# Patient Record
(demographics unavailable — no encounter records)

---

## 2024-11-18 NOTE — REASON FOR VISIT
[Behavioral Health Urgent Care Assessment] : a behavioral health urgent care assessment [School] : school [Patient] : patient [Self] : alone [Family Member] : with family member [TextBox_17] : for presenting adjustment and anxiety symptoms.
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
## 2024-11-18 NOTE — DISCUSSION/SUMMARY
[Low acute suicide risk] : Low acute suicide risk [No] : No [Not clinically indicated] : Safety Plan completed/updated (for individuals at risk): Not clinically indicated [FreeTextEntry1] : Risk factors: male gender, depressive symptoms, anxiety symptoms, h/o ADHD, psychosocial stressors, not in treatment, family history of substance abuse.   Protective factors: domiciled with supportive family, engaged in school, no prior SA or SIB, not current si/i/p, no h/o violence, no current hi/i/p, help-seeking, motivated for outpatient treatment, future oriented with short and long term goals, barriers to suicide, no access to guns, not acutely manic or psychotic, no substance abuse, no trauma hx, no family history of suicide.

## 2024-11-18 NOTE — RISK ASSESSMENT
[Clinical Interview] : Clinical Interview [Collateral Sources] : Collateral Sources [ADHD] : ADHD [Impulsivity] : impulsivity [Severe anxiety, agitation or panic] : severe anxiety, agitation or panic [Non-compliant or not receiving treatment] : non-compliant or not receiving treatment [None known] : None known [Identifies reasons for living] : identifies reasons for living [Supportive social network of family or friends] : supportive social network of family or friends [Ability to cope with stress] : ability to cope with stress [Responsibility to children, family, or others] : responsibility to children, family, or others [Frustration tolerance] : frustration tolerance [Engaged in work or school] : engaged in work or school [None in the patient's lifetime] : None in the patient's lifetime [None Known] : none known [No] : no [No known risk factors] : No known risk factors [Residential stability] : residential stability [Relationship stability] : relationship stability [Affective stability] : affective stability [Sobriety] : sobriety [Yes] : yes [de-identified] : no access to either reported.

## 2024-11-18 NOTE — PLAN
[TextBox_9] : patient will benefit from connecting to individual therapy. Stepmom was provided with [employer] scales and follow up appt with peds neuro for further eval for ADHD  [TextBox_11] :  no clinical indication for pharmacotherapy at this time [TextBox_13] : no acute safety concerns at this time [TextBox_26] : school referral/signed consent/emailed school
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
## 2024-12-30 NOTE — PLAN
[FreeTextEntry1] : -Psychoeducation provided regarding possible ADHD diagnosis. -[place] parent and teacher forms to be completed prior to next visit. -Family asked to provide school report cards/progress reports -Family to provide results from previous neuropsychological testing. and current or past IEP forms. -Follow up in 1 month

## 2024-12-30 NOTE — PHYSICAL EXAM
[Well-appearing] : well-appearing [No dysmorphic facial features] : no dysmorphic facial features [Alert] : alert [Well related, good eye contact] : well related, good eye contact [Conversant] : conversant [Normal speech and language] : normal speech and language [Follows instructions well] : follows instructions well [Gross hearing intact] : gross hearing intact [Normal gait] : normal gait [de-identified] : Interactive and appropriate mood, friendly.

## 2024-12-30 NOTE — ASSESSMENT
[FreeTextEntry1] : Philip is a 14 y/o with a reported hx of ADHD, here for an initial visit for inattention and behavioral concerns, referred by  Urgent Care where he was seen 11/18/4 for adjustment and anxiety symptoms.  He lives with his father and step mother and has minimal contact with mother.  He had recently moved and changed schools.  He has an IEP for a possible diagnosis of ADHD.  Father/stepmother do not have records of prior ADHD testing or diagnosis.  He has never been on ADHD medication.   Will proceed with ADHD assessment.  [place] forms provided.  I have also requested a copy of his IEP.

## 2024-12-30 NOTE — PHYSICAL EXAM
[Well-appearing] : well-appearing [No dysmorphic facial features] : no dysmorphic facial features [Alert] : alert [Well related, good eye contact] : well related, good eye contact [Conversant] : conversant [Normal speech and language] : normal speech and language [Follows instructions well] : follows instructions well [Gross hearing intact] : gross hearing intact [Normal gait] : normal gait [de-identified] : Interactive and appropriate mood, friendly.

## 2024-12-30 NOTE — ASSESSMENT
[FreeTextEntry1] : Phiilp is a 14 y/o with a reported hx of ADHD, here for an initial visit for inattention and behavioral concerns, referred by  Urgent Care where he was seen 11/18/4 for adjustment and anxiety symptoms.  He lives with his father and step mother and has minimal contact with mother.  He had recently moved and changed schools.  He has an IEP for a possible diagnosis of ADHD.  Father/stepmother do not have records of prior ADHD testing or diagnosis.  He has never been on ADHD medication.   Will proceed with ADHD assessment.  [place] forms provided.  I have also requested a copy of his IEP.
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
## 2024-12-30 NOTE — PLAN
[FreeTextEntry1] : -Psychoeducation provided regarding possible ADHD diagnosis. -[place] parent and teacher forms to be completed prior to next visit. -Family asked to provide school report cards/progress reports -Family to provide results from previous neuropsychological testing. and current or past IEP forms. -Follow up in 1 month

## 2025-01-31 NOTE — ASSESSMENT
[FreeTextEntry1] : Philip is a 14 y/o with a reported hx of ADHD, here for a follow up ADHD evaluation s/p seen 12/30/24 for inattention and behavioral concerns.  He was referred by  Urgent Care where he was seen 11/18/4 for adjustment and anxiety symptoms.  He lives with his father and step mother and has minimal contact with mother.  He had recently moved and changed schools.  He has an IEP for a possible diagnosis of ADHD.  Father/stepmother do not have records of prior ADHD testing or diagnosis.  He has never been on ADHD medication.

## 2025-01-31 NOTE — HISTORY OF PRESENT ILLNESS
[FreeTextEntry1] : Philip is a 12 y/o with a reported hx of ADHD, here for a follow up ADHD evaluation s/p seen 12/30/24 for inattention and behavioral concerns.  He was referred by  Urgent Care where he was seen 11/18/4 for adjustment and anxiety symptoms.  He lives with his father and step mother and has minimal contact with mother.  He had recently moved and changed schools.  He has an IEP for a possible diagnosis of ADHD.  Father/stepmother do not have records of prior ADHD testing or diagnosis.  He has never been on ADHD medication.    I requested a copy of his IEP.    PLAN FROM PREVIOUS VISIT -Psychoeducation provided regarding possible ADHD diagnosis. -[employer] [place] parent and teacher forms to be completed prior to next visit. -Family asked to provide school report cards/progress reports -Family to provide results from previous neuropsychological testing. and current or past IEP forms. -Follow up in 1 month  INTERIM HPI  NAHED SCALES- Need Parent form (2 Teacher forms received, 2nd page of teacher #2 not) Reva spoke to father 01/31/25 who will send Parent form and IEP.    PARENT: Inattention: /9 Hyperactivity: /9 ODD:   /8 CD:  /14 Anxiety/Depression:  /7 IMPAIRMENTS (score of 4 or 5)   Academic overall performance:    Reading,    Writing    Math:   Relationships with parents:                                 siblings:                                 peers:                                 organized activities/teams:   TEACHER: English 01/07/25  Inattention:  6/9 + Hyperactivity:   0/9 ODD/CD:  0/10 Anxiety/Depression:  0/7 IMPAIRMENTS Academic & Social Performance in any of the following? 1.Reading YES (5= PROBLEMATIC) 2 Writing YES (5= PROBLEMATIC) 3.Math n/a 4. Relationships with peers No (score of 3= average) 5. Following directions YES (4= SOMEWHAT OF A PROBLEM) 6. Disrupting class YES (4= SOMEWHAT OF A PROBLEM) 7. Assignment completion YES (5= PROBLEMATIC) 8. Organizational skills YES (5= PROBLEMATIC)   TEACHER #2: Social Studies  Inattentive:  6/9 + Hyperactive:  0/9 ODD/CD  0/10 Anxiety/depression:   0/7 IMPAIRMENTS IN ANY OF THE FOLLOWING (academic & Social Performance) 2nd page not received. 1.Reading 2 Writing 3.Math 4. Relationships with peers 5. Following directions 6. Disrupting class 7. Assignment completion 8. Organizational skills.  HPI FROM VISIT ON 12/30/24  Philip is a 12 y/o here with his stepmother for an initial visit for inattention and behavioral concerns, referred by  Urgent Care   Records Reviewed: 11/18/24 Seen in  Urgent. Care for adjustment and anxiety symptoms, has a prior diagnosis of ADHD and has an IEP. He had recently moved and changed schools, and has an IEP.  His parents are , and his mother moved to [place] 2 1/2 years ago.  Prior lived with mother and GM, moved due to toxic environment.  He was referred for psychotherapy, and provided [facility] forms for ADHD consultation. Clinical Summary from [facility]: "Patient is a 12 year-old male, domiciled with father, stepmother and sister, enrolled in [employer], in the 7th grade regular education, IEP, with prior psychiatric history of ADHD, not currently in outpatient treatment, no h/o of self-injury or suicide attempts, no h/o aggression/violence/legal issues, no CPS involvement, no substance use, no known trauma hx, no significant pmhx, now presenting accompanied by his stepmother as he was referred by school for presenting adjustment and anxiety symptoms. Patient is presenting with symptoms pertaining to adjustment and anxiety stressors. Possible ADHD would benefit from further investigation, provided [facility] and referred to pediatric neurology. Patient denies any symptoms of depression."  Current HPI: Patient is a 13 year-old male, domiciled with father, stepmother and sister, enrolled in [employer], in the 7th grade regular education. He has an IEP, however stepmother did not bring copy.  She believes it is for either ADHD or a learning disability.   Stepmother report that last year, Philip really was struggling in school, however this year he is doing much better.  She would like to help him find a therapist to help him with ADHD symptoms.  Step-mother endorses she had ADHD growing up and was treated with medication and had a lot of side effects, so is reluctant for Philip to take meds.  Patient endorses he loses focus a lot in class, and also gets side tracked easily.   He recalls always being this way. He has problems with organization, has often had missing assignments which last year affected his grades.  He fidgets and has a hard time sitting down.   He does his homework either at home or at school.  Takes a long time to complete, hard to focus, usually takes about an hour. Goes to [employer] and has good friends.  He used to live in [place] and still sees his old friends.  He feels he is doing quite well now, his grades in 80's or 90's. He has an IEP and is taken out of room for tests, had extended time, and had tests read to him.  Some of his class have 2 teachers.  He had group counseling.     His stepmother works at her own business so it's his father who tries to make sure he does get his homework done.  She sees a big improvement compared to last year, he is more organized and does what he has to do.   Stepmother was not able to find his grades on the [employer] but endorses he has been doing well.  In his old school, his teachers said he was fidgety, talked too much, and was not doing all his work.  This year had Zoom meeting to discuss progress report, and things were quite good except he has to work on handing in his work.  He is a pleasure to have in class and he participates.    Patient endorses he has a hard time focusing in some of his classes, such as [employer].  He finds [employer] boring.  He gets distracted by noises or things going on around him.  He has a hard time remembering and then gets confused and overwhelmed. He zones out during conversations.  He does his homework most of the time now.  He sometimes fidgets but can stay seated usually but feels the urge to get up.    DEVELOPMENTAL HX Child was born full term without complications and reached all age-appropriate developmental milestones without concerns.  Early Intervention Services were not needed.  HOME BEHAVIORS:   -Attention: Parent report patient is very easily distracted, needs frequent redirection, and has difficulty with multi-step instructions.  Only asks him to do one thing at a time.  Sometimes he'll do half the job, then get distracted and leave it unfinished, such as when asked to put something away.  -Hyperactivity: Fidgety.  He can't sit and watch a whole TV show. -Impulsivity:  Is not aggressive, does not interrupt when others are speaking. -Organization: Has difficulty staying organized, but better recently.  Used to often loses things but better with that.   -Homework: No handing things in, though improved.    CURRENT SCHOOL -School: [employer], -Public school, 7th grade  -Special Ed services IEP -  Classification: ? - General education classroom - Special Ed/educational services: -Accommodations- Testing accommodations? -Academic performance/grades: Average?  Better than last year.      RELATIONSHIPS: Gets along well with peers, father, step-mother Limited relationship with mother   EMOTIONAL Anxiety   SLEEP Sleeps 10-6:15 am.    ACTIVITIES/INTERESTS: Youth club   MEDICAL HISTORY: Denies a history of tics Denies history of starting spells, twitching, seizure-like activity.   FAMILY HISTORY: Family history of ADHD: Likely mother and aunt. Family history of anxiety or depression: parents Denies family history of cardiac conditions or early unexplained deaths.

## 2025-01-31 NOTE — REASON FOR VISIT
[Home] : at home, [unfilled] , at the time of the visit. [Medical Office: ([facility])___] : at the medical office located in  [Mother] : mother [Follow-Up Evaluation] : a follow-up evaluation for [ADHD] : ADHD [FreeTextEntry2] : mother